# Patient Record
Sex: FEMALE | Race: OTHER
[De-identification: names, ages, dates, MRNs, and addresses within clinical notes are randomized per-mention and may not be internally consistent; named-entity substitution may affect disease eponyms.]

---

## 2018-05-31 ENCOUNTER — HOSPITAL ENCOUNTER (EMERGENCY)
Dept: HOSPITAL 54 - ER | Age: 58
LOS: 1 days | Discharge: HOME | End: 2018-06-01
Payer: MEDICARE

## 2018-05-31 VITALS — BODY MASS INDEX: 31.41 KG/M2 | WEIGHT: 160 LBS | HEIGHT: 60 IN

## 2018-05-31 VITALS — SYSTOLIC BLOOD PRESSURE: 157 MMHG | DIASTOLIC BLOOD PRESSURE: 85 MMHG

## 2018-05-31 DIAGNOSIS — M25.521: ICD-10-CM

## 2018-05-31 DIAGNOSIS — M25.511: Primary | ICD-10-CM

## 2018-05-31 DIAGNOSIS — M25.561: ICD-10-CM

## 2018-05-31 DIAGNOSIS — E11.9: ICD-10-CM

## 2018-05-31 DIAGNOSIS — I10: ICD-10-CM

## 2018-05-31 DIAGNOSIS — Z98.84: ICD-10-CM

## 2018-05-31 DIAGNOSIS — Z90.49: ICD-10-CM

## 2018-05-31 PROCEDURE — Z7610: HCPCS

## 2018-05-31 PROCEDURE — A4606 OXYGEN PROBE USED W OXIMETER: HCPCS

## 2019-04-08 ENCOUNTER — HOSPITAL ENCOUNTER (INPATIENT)
Dept: HOSPITAL 10 - E/R | Age: 59
LOS: 1 days | Discharge: HOME | DRG: 563 | End: 2019-04-09
Attending: INTERNAL MEDICINE | Admitting: INTERNAL MEDICINE
Payer: MEDICARE

## 2019-04-08 ENCOUNTER — HOSPITAL ENCOUNTER (INPATIENT)
Dept: HOSPITAL 91 - MS1 | Age: 59
LOS: 1 days | Discharge: HOME | DRG: 563 | End: 2019-04-09
Payer: MEDICARE

## 2019-04-08 VITALS
WEIGHT: 169.32 LBS | BODY MASS INDEX: 34.13 KG/M2 | BODY MASS INDEX: 34.13 KG/M2 | HEIGHT: 59 IN | WEIGHT: 169.32 LBS | HEIGHT: 59 IN

## 2019-04-08 DIAGNOSIS — E78.5: ICD-10-CM

## 2019-04-08 DIAGNOSIS — E87.6: ICD-10-CM

## 2019-04-08 DIAGNOSIS — I10: ICD-10-CM
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
## 2019-04-08 DIAGNOSIS — S42.212A: Primary | ICD-10-CM

## 2019-04-08 LAB
ADD MAN DIFF?: NO
ALANINE AMINOTRANSFERASE: 24 IU/L (ref 13–69)
ALBUMIN/GLOBULIN RATIO: 1.36
ALBUMIN: 4.1 G/DL (ref 3.3–4.9)
ALKALINE PHOSPHATASE: 71 IU/L (ref 42–121)
ANION GAP: 11 (ref 5–13)
ASPARTATE AMINO TRANSFERASE: 29 IU/L (ref 15–46)
BASOPHIL #: 0 10^3/UL (ref 0–0.1)
BASOPHILS %: 0.2 % (ref 0–2)
BILIRUBIN,DIRECT: 0 MG/DL (ref 0–0.2)
BILIRUBIN,TOTAL: 0.4 MG/DL (ref 0.2–1.3)
BLOOD UREA NITROGEN: 17 MG/DL (ref 7–20)
CALCIUM: 8.8 MG/DL (ref 8.4–10.2)
CARBON DIOXIDE: 26 MMOL/L (ref 21–31)
CHLORIDE: 103 MMOL/L (ref 97–110)
CREATININE: 0.38 MG/DL (ref 0.44–1)
EOSINOPHILS #: 0 10^3/UL (ref 0–0.5)
EOSINOPHILS %: 0 % (ref 0–7)
GLOBULIN: 3 G/DL (ref 1.3–3.2)
GLUCOSE: 123 MG/DL (ref 70–220)
HEMATOCRIT: 33.9 % (ref 37–47)
HEMOGLOBIN: 11.3 G/DL (ref 12–16)
IMMATURE GRANS #M: 0.04 10^3/UL (ref 0–0.03)
IMMATURE GRANS % (M): 0.4 % (ref 0–0.43)
INR: 1.12
LIPASE: 90 U/L (ref 23–300)
LYMPHOCYTES #: 0.8 10^3/UL (ref 0.8–2.9)
LYMPHOCYTES %: 6.8 % (ref 15–51)
MEAN CORPUSCULAR HEMOGLOBIN: 29.3 PG (ref 29–33)
MEAN CORPUSCULAR HGB CONC: 33.3 G/DL (ref 32–37)
MEAN CORPUSCULAR VOLUME: 87.8 FL (ref 82–101)
MEAN PLATELET VOLUME: 8.8 FL (ref 7.4–10.4)
MONOCYTE #: 0.4 10^3/UL (ref 0.3–0.9)
MONOCYTES %: 3.2 % (ref 0–11)
NEUTROPHIL #: 9.9 10^3/UL (ref 1.6–7.5)
NEUTROPHILS %: 89.4 % (ref 39–77)
NUCLEATED RED BLOOD CELLS #: 0 10^3/UL (ref 0–0)
NUCLEATED RED BLOOD CELLS%: 0 /100WBC (ref 0–0)
PARTIAL THROMBOPLASTIN TIME: 26.8 SEC (ref 23–35)
PLATELET COUNT: 227 10^3/UL (ref 140–415)
POTASSIUM: 3.4 MMOL/L (ref 3.5–5.1)
PROTIME: 14.5 SEC (ref 11.9–14.9)
PT RATIO: 1.1
RED BLOOD COUNT: 3.86 10^6/UL (ref 4.2–5.4)
RED CELL DISTRIBUTION WIDTH: 11.8 % (ref 11.5–14.5)
SODIUM: 140 MMOL/L (ref 135–144)
TOTAL PROTEIN: 7.1 G/DL (ref 6.1–8.1)
WHITE BLOOD COUNT: 11.1 10^3/UL (ref 4.8–10.8)

## 2019-04-08 PROCEDURE — 96372 THER/PROPH/DIAG INJ SC/IM: CPT

## 2019-04-08 PROCEDURE — 71045 X-RAY EXAM CHEST 1 VIEW: CPT

## 2019-04-08 PROCEDURE — 73200 CT UPPER EXTREMITY W/O DYE: CPT

## 2019-04-08 PROCEDURE — 73030 X-RAY EXAM OF SHOULDER: CPT

## 2019-04-08 PROCEDURE — 36415 COLL VENOUS BLD VENIPUNCTURE: CPT

## 2019-04-08 PROCEDURE — 96374 THER/PROPH/DIAG INJ IV PUSH: CPT

## 2019-04-08 PROCEDURE — 80053 COMPREHEN METABOLIC PANEL: CPT

## 2019-04-08 PROCEDURE — 85730 THROMBOPLASTIN TIME PARTIAL: CPT

## 2019-04-08 PROCEDURE — 80061 LIPID PANEL: CPT

## 2019-04-08 PROCEDURE — 96375 TX/PRO/DX INJ NEW DRUG ADDON: CPT

## 2019-04-08 PROCEDURE — 85610 PROTHROMBIN TIME: CPT

## 2019-04-08 PROCEDURE — 84100 ASSAY OF PHOSPHORUS: CPT

## 2019-04-08 PROCEDURE — 83735 ASSAY OF MAGNESIUM: CPT

## 2019-04-08 PROCEDURE — 99285 EMERGENCY DEPT VISIT HI MDM: CPT

## 2019-04-08 PROCEDURE — 85025 COMPLETE CBC W/AUTO DIFF WBC: CPT

## 2019-04-08 PROCEDURE — 83036 HEMOGLOBIN GLYCOSYLATED A1C: CPT

## 2019-04-08 PROCEDURE — 72170 X-RAY EXAM OF PELVIS: CPT

## 2019-04-08 PROCEDURE — 83690 ASSAY OF LIPASE: CPT

## 2019-04-08 RX ADMIN — ONDANSETRON HYDROCHLORIDE 1 MG: 2 INJECTION, SOLUTION INTRAMUSCULAR; INTRAVENOUS at 21:44

## 2019-04-08 RX ADMIN — LIDOCAINE HYDROCHLORIDE 1 MLS/HR: 10 INJECTION, SOLUTION EPIDURAL; INFILTRATION; INTRACAUDAL; PERINEURAL at 21:45

## 2019-04-08 RX ADMIN — LORAZEPAM 1 MG: 0.5 TABLET ORAL at 20:10

## 2019-04-08 RX ADMIN — KETOROLAC TROMETHAMINE 1 MG: 30 INJECTION, SOLUTION INTRAMUSCULAR at 19:56

## 2019-04-08 NOTE — ERD
ER Documentation


Chief Complaint


Chief Complaint





left shoulder & wrist pain s/p GLF getting into car.





HPI


58-year-old woman brought in by EMS after trip and fall while walking to her 


car, she fell toward her left side and landed on her left shoulder and describes


pain and discomfort to the left shoulder and left thigh.  She denies head or 


neck injury, no loss of consciousness, no chest pain or shortness of breath, no 


complaints of ankle pain or swelling.  Patient was transported here by EMS 


without further complications.





ROS


All systems reviewed and are negative except as per history of present illness.





Medications


Home Meds


Reported Medications


Ergocalciferol (Vitamin D2) (VITAMIN D2) 50,000 Unit Capsule, 04812 UNIT PO 


QSUNDAY, CAP


   4/8/19


Hydrocodone/Acetaminophen (Norco 5-325 Tablet) 1 Each Tablet, 1 EACH PO Q6H PRN 


for PAIN LEVEL 6-10, TAB


   4/8/19


Linaclotide (LINZESS) 145 Mcg Capsule, 145 MCG PO DAILY for 30 Days, #30


   4/8/19


Solifenacin* (Vesicare*) 5 Mg Tablet, 5 MG PO DAILY for 30 Days, #30


   4/8/19


Escitalopram Oxalate* (Escitalopram Oxalate*) 10 Mg Tablet, 10 MG PO DAILY for 


30 Days, #30


   4/8/19


Alendronate Sodium* (Fosamax*) 70 Mg Tablet, 70 MG PO Q7D PRN for QSUNDAY for 28


Days


   4/8/19


Omeprazole* (Omeprazole*) 20 Mg Capsule.dr, 20 MG PO DAILY for 30 Days, #30


   4/8/19


Atorvastatin (Atorvastatin) 10 Mg Tablet, 10 MG PO QHS for 30 Days, #30


   4/8/19


Tramadol HCl (Tramadol HCl) 50 Mg Tablet, 50 MG PO Q6H PRN for PAIN LEVEL 6-10


   4/8/19


Zolpidem Tartrate* (Zolpidem Tartrate*) 10 Mg Tablet, 10 MG PO DAILY for 30 


Days, #30


   4/8/19


Amlodipine Besylate* (Amlodipine Besylate*) 10 Mg Tablet, 10 MG PO DAILY for 30 


Days, #30


   4/8/19


Atenolol* (Atenolol*) 25 Mg Tablet, 25 MG PO DAILY for 30 Days, #30


   4/8/19


Metformin* (Glucophage*) 500 Mg Tab, 500 MG PO DAILY for 30 Days, #30


   4/8/19


Diclofenac Sodium* (Diclofenac Sodium*) 75 Mg Tablet.dr, 75 MG PO BID for 30 


Days, #60


   4/8/19


Furosemide* (Furosemide*) 20 Mg Tablet, 20 MG PO for 30 Days, #30


   4/8/19


Lisinopril* (Lisinopril*) 20 Mg Tablet, 20 MG PO BID for 30 Days, #60


   4/8/19





Allergies


Allergies:  


Coded Allergies:  


     No Known Allergy (Unverified , 4/8/19)





PMhx/Soc


History of Surgery:  Yes (LEFT ARM REPAIR, METAL IMPLANT)


Anesthesia Reaction:  No


Hx Neurological Disorder:  No


Hx Respiratory Disorders:  No


Hx Cardiac Disorders:  Yes (HTN)


Hx Psychiatric Problems:  No


Hx Miscellaneous Medical Probl:  No


Hx Alcohol Use:  No


Hx Substance Use:  No


Hx Tobacco Use:  No


Smoking Status:  Never smoker





FmHx


Family History:  No diabetes





Physical Exam


Vitals





Vital Signs


  Date      Temp  Pulse  Resp  B/P (MAP)   Pulse Ox  O2          O2 Flow    FiO2


Time                                                 Delivery    Rate


    4/8/19  99.1     83    20      142/70        99  Room Air


     21:29                           (94)


    4/8/19  99.1     86    24      144/75       100  Room Air


     20:19                           (98)


    4/8/19  99.1     81    24      150/80       100  Room Air


     19:33                          (103)


    4/8/19  99.1     72    18      205/91       100


     18:10                          (129)





Physical Exam


Const:   Anxious, afebrile, moderate discomfort


Head:   Atraumatic, no hemotympanum


Eyes:    Normal Conjunctiva


ENT:    Normal External Ears, Nose and Mouth.


Neck:               Full range of motion. No meningismus.


Resp:   Clear to auscultation bilaterally


Cardio:   Regular rate and rhythm, no murmurs


Abd:    Soft, non tender, non distended. Normal bowel sounds


Skin:   No petechiae or rashes, no obvious hematomas, abrasions, ecchymosis


Back:   No midline or flank tenderness


Ext:    No cyanosis, or edema.  Tenderness over the left lateral shoulder, 


distal pulses equal bilateral


Neur:   Awake and alert x3, no focal deficits or facial asymmetry, moving all 


extremities


Psych:    Anxious


Results 24 hrs





Current Medications


 Medications
   Dose
          Sig/Luther
       Start Time
   Status  Last


 (Trade)       Ordered        Route
 PRN     Stop Time              Admin
Dose


                              Reason                                Admin


 Ketorolac
     30 mg          ONCE  STAT
    4/8/19        DC            4/8/19


Tromethamine
                 IM
            19:48
 4/8/19                19:56



 (Toradol)                                   19:50


 Lorazepam
     0.5 mg         ONCE  ONCE
    4/8/19        DC            4/8/19


(Ativan)                      PO
            20:00
 4/8/19                20:10



                                             20:01


 Sodium         500 ml @ 
     Q1H STAT
      4/8/19 4/8/19


Chloride       500 mls/hr     IV
            21:40
 4/8/19                21:45



                                             22:39


 Morphine       4 mg           ONCE  STAT
    4/8/19        DC            4/8/19


Sulfate
                      IV
            21:40
 4/8/19                21:44



(morphine)                                   21:41


 Ondansetron    4 mg           ONCE  STAT
    4/8/19        DC            4/8/19


HCl
  (Zofran                 IV
            21:40
 4/8/19                21:44



Inj)                                         21:41








Procedures/MDM


IV line was established patient was placed on cardiac monitor rhythm strip 


revealed a sinus rhythm at about 80 bpm with upright P and T waves.  Patient was


afebrile





I initially administered Toradol 30 mg IM x1 and lorazepam 0.5 mg p.o. for her 


symptoms.





1 view chest x-ray performed, read by me revealed a left shoulder fracture, no 


acute infiltrates, no pneumothorax.





Three-view x-ray of the left shoulder performed, read by me revealed a 


transverse displaced humeral neck fracture





X-ray Pelvis 1V Interpreted by me: 


Bones:    No fracture


Joints:       No dislocation


Foreign body:    None





I started the IV line and administered 500 cc normal saline and also 


administered morphine 4 mg IV and Zofran 4 mg IV.





I spoke to orthopedic surgeon on call regarding the patient's x-ray findings, 


presentation and symptomatology, he agreed to admission and agreed to consult 


the patient.  He also ordered CT scan imaging of the left upper extremity.





Patient admitted to [facility].





Departure


Diagnosis:  


   Primary Impression:  


   Left humeral fracture


   Encounter type:  initial encounter  Humerus Location:  surgical neck  


   Fracture type:  closed  Fracture morphology:  unspecified fracture morphology


    Fracture alignment:  displaced  Qualified Codes:  S42.212A - Unspecified 


   displaced fracture of surgical neck of left humerus, initial encounter for 


   closed fracture


Condition:  LENY Schwartz MD              Apr 8, 2019 19:51

## 2019-04-08 NOTE — HP
Date/Time of Note


Date/Time of Note


DATE: 4/8/19 


TIME: 23:58





Assessment/Plan


VTE Prophylaxis


Pharmacological prophylaxis:  heparin





Lines/Catheters


IV Catheter Type (from Nrsg):  Saline Lock





Assessment/Plan


Assessment/Plan





1.  Left proximal humeral fracture: Status post mechanical fall


-Patient with a history of left shoulder surgery in 2007


-Pain management


-Awaiting Ortho eval





2.  Hypertension: BP within goal.  Continue home meds





3.  Dyslipidemia: Continue statin


Result Diagram:  


4/8/19 2230 4/8/19 2230





Results 24hrs





Laboratory Tests


               Test
                                4/8/19
22:30


               White Blood Count                         11.1  H


               Red Blood Count                           3.86  L


               Hemoglobin                                11.3  L


               Hematocrit                                33.9  L


               Mean Corpuscular Volume                    87.8


               Mean Corpuscular Hemoglobin                29.3


               Mean Corpuscular Hemoglobin
Concent       33.3  



               Red Cell Distribution Width                11.8


               Platelet Count                              227


               Mean Platelet Volume                        8.8


               Immature Granulocytes %                   0.400


               Neutrophils %                             89.4  H


               Lymphocytes %                              6.8  L


               Monocytes %                                 3.2


               Eosinophils %                               0.0


               Basophils %                                 0.2


               Nucleated Red Blood Cells %                 0.0


               Immature Granulocytes #                  0.040  H


               Neutrophils #                              9.9  H


               Lymphocytes #                               0.8


               Monocytes #                                 0.4


               Eosinophils #                               0.0


               Basophils #                                 0.0


               Nucleated Red Blood Cells #                 0.0


               Prothrombin Time                           14.5


               Prothrombin Time Ratio                      1.1


               INR International Normalized
Ratio        1.12  



               Activated Partial
Thromboplast Time       26.8  



               Sodium Level                                140


               Potassium Level                            3.4  L


               Chloride Level                              103


               Carbon Dioxide Level                         26


               Anion Gap                                    11


               Blood Urea Nitrogen                          17


               Creatinine                                0.38  L


               Est Glomerular Filtrat Rate
mL/min   > 60  



               Glucose Level                               123


               Calcium Level                               8.8


               Total Bilirubin                             0.4


               Direct Bilirubin                           0.00


               Indirect Bilirubin                          0.4


               Aspartate Amino Transf
(AST/SGOT)           29  



               Alanine Aminotransferase
(ALT/SGPT)         24  



               Alkaline Phosphatase                         71


               Total Protein                               7.1


               Albumin                                     4.1


               Globulin                                   3.00


               Albumin/Globulin Ratio                     1.36


               Lipase                                       90








HPI/ROS


Admit Date/Time


Admit Date/Time








Hx of Present Illness





This is a 4-year-old female with a history of hypertension, dyslipidemia, left 


shoulder surgery in 2007, gastric bypass surgery.  Patient presented to ER 


complaining of left shoulder pain status post mechanical fall.  Daughter was at 


the bedside helping with the history.  Patient tripped over a small hole that 


was outside the bathroom which resulted in her fall.  Imaging in the ER shows 


acute left proximal humeral fracture.





PMH/Family/Social


Past Medical History


Medical History:  other (see hpi)


Coded Allergies:  


     azithromycin (Verified  Allergy, Unknown, STOMACH UPSET, VITTING, 4/8/19)





Past Surgical History


Past Surgical Hx:  other (see hpi)





Family History


Significant Family History:  no pertinent family hx





Social History


Alcohol Use:  other


Smoking Status:  Never smoker


Drug Use:  none











Exam


Constitutional:  other (no acute distress)


Head:  normocephalic


Neck:  supple


Respiratory:  normal air movement


Cardiovascular:  regular rate and rhythm


Gastrointestinal:  soft


Coded Allergies:  


     No Known Allergy (Unverified , 4/8/19)





Social History


Smoking Status:  Never smoker





Exam/Review of Systems


Vital Signs


Vitals





Vital Signs


  Date      Temp  Pulse  Resp  B/P (MAP)   Pulse Ox  O2          O2 Flow    FiO2


Time                                                 Delivery    Rate


    4/8/19  99.1     81    19      121/67        95  Room Air


     22:57                           (85)














ELIZABETH HENSON MD                  Apr 8, 2019 23:58

## 2019-04-09 VITALS — SYSTOLIC BLOOD PRESSURE: 142 MMHG | HEART RATE: 89 BPM | RESPIRATION RATE: 18 BRPM | DIASTOLIC BLOOD PRESSURE: 68 MMHG

## 2019-04-09 VITALS — SYSTOLIC BLOOD PRESSURE: 120 MMHG | DIASTOLIC BLOOD PRESSURE: 57 MMHG | RESPIRATION RATE: 18 BRPM | HEART RATE: 70 BPM

## 2019-04-09 VITALS — RESPIRATION RATE: 19 BRPM | DIASTOLIC BLOOD PRESSURE: 61 MMHG | SYSTOLIC BLOOD PRESSURE: 136 MMHG | HEART RATE: 78 BPM

## 2019-04-09 LAB
ADD MAN DIFF?: NO
ALANINE AMINOTRANSFERASE: 18 IU/L (ref 13–69)
ALBUMIN/GLOBULIN RATIO: 1.35
ALBUMIN: 3.8 G/DL (ref 3.3–4.9)
ALKALINE PHOSPHATASE: 59 IU/L (ref 42–121)
ANION GAP: 10 (ref 5–13)
ASPARTATE AMINO TRANSFERASE: 25 IU/L (ref 15–46)
BASOPHIL #: 0 10^3/UL (ref 0–0.1)
BASOPHILS %: 0.4 % (ref 0–2)
BILIRUBIN,DIRECT: 0 MG/DL (ref 0–0.2)
BILIRUBIN,TOTAL: 0.6 MG/DL (ref 0.2–1.3)
BLOOD UREA NITROGEN: 17 MG/DL (ref 7–20)
CALCIUM: 8.6 MG/DL (ref 8.4–10.2)
CARBON DIOXIDE: 28 MMOL/L (ref 21–31)
CHLORIDE: 102 MMOL/L (ref 97–110)
CHOL/HDL RATIO: 3.1 RATIO
CHOLESTEROL: 193 MG/DL (ref 100–200)
CREATININE: 0.43 MG/DL (ref 0.44–1)
EOSINOPHILS #: 0 10^3/UL (ref 0–0.5)
EOSINOPHILS %: 0.1 % (ref 0–7)
GLOBULIN: 2.8 G/DL (ref 1.3–3.2)
GLUCOSE: 102 MG/DL (ref 70–220)
HDL CHOLESTEROL: 62 MG/DL (ref 35–98)
HEMATOCRIT: 32.4 % (ref 37–47)
HEMOGLOBIN A1C: 5.1 % (ref 0–5.9)
HEMOGLOBIN: 10.8 G/DL (ref 12–16)
IMMATURE GRANS #M: 0.03 10^3/UL (ref 0–0.03)
IMMATURE GRANS % (M): 0.4 % (ref 0–0.43)
LDL CHOLESTEROL,CALCULATED: 114 MG/DL
LYMPHOCYTES #: 1.9 10^3/UL (ref 0.8–2.9)
LYMPHOCYTES %: 22.5 % (ref 15–51)
MAGNESIUM: 1.7 MG/DL (ref 1.7–2.5)
MEAN CORPUSCULAR HEMOGLOBIN: 29.3 PG (ref 29–33)
MEAN CORPUSCULAR HGB CONC: 33.3 G/DL (ref 32–37)
MEAN CORPUSCULAR VOLUME: 87.8 FL (ref 82–101)
MEAN PLATELET VOLUME: 9.3 FL (ref 7.4–10.4)
MONOCYTE #: 0.6 10^3/UL (ref 0.3–0.9)
MONOCYTES %: 7.3 % (ref 0–11)
NEUTROPHIL #: 5.9 10^3/UL (ref 1.6–7.5)
NEUTROPHILS %: 69.3 % (ref 39–77)
NUCLEATED RED BLOOD CELLS #: 0 10^3/UL (ref 0–0)
NUCLEATED RED BLOOD CELLS%: 0 /100WBC (ref 0–0)
PHOSPHORUS: 4.6 MG/DL (ref 2.5–4.9)
PLATELET COUNT: 226 10^3/UL (ref 140–415)
POTASSIUM: 3.2 MMOL/L (ref 3.5–5.1)
RED BLOOD COUNT: 3.69 10^6/UL (ref 4.2–5.4)
RED CELL DISTRIBUTION WIDTH: 11.9 % (ref 11.5–14.5)
SODIUM: 140 MMOL/L (ref 135–144)
TOTAL PROTEIN: 6.6 G/DL (ref 6.1–8.1)
TRIGLYCERIDES: 85 MG/DL (ref 0–149)
WHITE BLOOD COUNT: 8.5 10^3/UL (ref 4.8–10.8)

## 2019-04-09 RX ADMIN — SOLIFENACIN SUCCINATE 1 MG: 5 TABLET, FILM COATED ORAL at 09:00

## 2019-04-09 RX ADMIN — HYDROCODONE BITARTRATE AND ACETAMINOPHEN PRN TAB: 5; 325 TABLET ORAL at 09:57

## 2019-04-09 RX ADMIN — ESCITALOPRAM OXALATE 1 MG: 10 TABLET ORAL at 09:00

## 2019-04-09 RX ADMIN — PANTOPRAZOLE SODIUM 1 MG: 40 TABLET, DELAYED RELEASE ORAL at 05:08

## 2019-04-09 RX ADMIN — AMLODIPINE BESYLATE 1 MG: 10 TABLET ORAL at 09:00

## 2019-04-09 RX ADMIN — HEPARIN SODIUM 1 UNIT: 5000 INJECTION, SOLUTION INTRAVENOUS; SUBCUTANEOUS at 09:00

## 2019-04-09 RX ADMIN — FUROSEMIDE 1 MG: 20 TABLET ORAL at 09:00

## 2019-04-09 RX ADMIN — DICLOFENAC SODIUM 1 MG: 75 TABLET, DELAYED RELEASE ORAL at 09:00

## 2019-04-09 RX ADMIN — LISINOPRIL 1 MG: 20 TABLET ORAL at 09:00

## 2019-04-09 RX ADMIN — ATENOLOL 1 MG: 25 TABLET ORAL at 08:47

## 2019-04-09 RX ADMIN — HYDROCODONE BITARTRATE AND ACETAMINOPHEN 1 TAB: 5; 325 TABLET ORAL at 05:38

## 2019-04-09 RX ADMIN — HYDROCODONE BITARTRATE AND ACETAMINOPHEN 1 TAB: 5; 325 TABLET ORAL at 12:08

## 2019-04-09 RX ADMIN — HYDROCODONE BITARTRATE AND ACETAMINOPHEN PRN TAB: 5; 325 TABLET ORAL at 05:38

## 2019-04-09 RX ADMIN — HYDROCODONE BITARTRATE AND ACETAMINOPHEN 1 TAB: 5; 325 TABLET ORAL at 01:15

## 2019-04-09 RX ADMIN — HYDROCODONE BITARTRATE AND ACETAMINOPHEN 1 TAB: 5; 325 TABLET ORAL at 09:57

## 2019-04-09 NOTE — HPN
Date/Time of Note


Date/Time of Note


DATE: 4/9/19 


TIME: 07:24





Interval H&P Admission Note


Pt. seen H&P reviewed:  No system changes


Patient denies fever, chills, shortness of breath, chest pain, nausea/vomiting, 


constipation, diarrhea, numbness, and tingling.








MUSCULOSKELETAL:


Right extremity


Skin intact


Sensation intact to light touch in a sural, saphenous, deep peroneal, 


superficial peroneal, medial and lateral plantar nerve distribution. Motor is 


intact, patient able to dorsiflex and plantarflex ankle and extend and flex 


great toe. Dorsalis Pedis pulse +2, Brisk capillary refill.  Compartments are 


soft.  Calves non-tender to palpation bilaterally.











DAYANARA GALLO MD                Apr 9, 2019 07:24

## 2019-04-09 NOTE — DS
Date/Time of Note


Date/Time of Note


DATE: 4/9/19 


TIME: 12:12





Discharge Summary


Admission/Discharge Info


Admit Date/Time


Apr 8, 2019 at 21:47


Discharge Date/Time





Discharge Diagnosis


1.  Left proximal humeral fracture: Status post mechanical fall, h/o left shoul


olvin surgery in 2007, patient wants to be seen by the same ortho and that is 


arranged


2.  Hypertension, controlled


3.  Dyslipidemia: Continue statin


4. Hypokalemia, KCL


5. Large glenohumeral lipohemarthrosis.


Patient Condition:  Stable


Procedures


                                        


PROCEDURE:   CT LEFT SHOULDER WITHOUT CONTRAST


 


CLINICAL INDICATION: 58 years of age, female. Left proximal humerus fracture.


 


TECHNIQUE:  A CT of the left shoulder was performed without intravenous 


contrast.  Coronal and sagittal reformatted images were obtained from the axial 


source images. Images were reviewed on a high-resolution PACS workstation. 3-D 


volume rendering was not performed.  DICOM images are available.


 


CTDIvol:  36 mGy. DLP:  815 mGy-cm.


One or more of the following dose reduction techniques were used:  


- Automated exposure control.


- Adjustment of the mA and/or kV according to patient size. 


- Use of iterative reconstruction technique.


 


COMPARISON:   Left shoulder x-rays from earlier the same day 


 


FINDINGS:


 


BONES AND JOINTS:


 


There is an acute comminuted fracture of the proximal humerus that involves the 


surgical neck and the greater tuberosity. There is impaction at the fracture 


through the surgical neck with angulation apex-anterior. There is anterior 


displacement of the distal fracture fragment a 1 cm. The acute fracture of the 


greater tuberosity is displaced laterally 0.3 cm.


 


Alignment of the glenohumeral joint is anatomic. Negative for acute fracture of 


the glenoid or scapula. There is a large lipohemarthrosis at the left 


glenohumeral joint. The lipohemarthrosis extends into the subacromial space 


likely due to the fracture of the greater tuberosity.


 


Acromioclavicular joint is unremarkable. Coraco-clavicular interval is normal.


 


Bones are osteopenic.


 


SOFT TISSUES:


 


Muscles of the rotator cuff are normal in bulk. There is mild periarticular soft


tissue swelling over the left shoulder.


 


VISUALIZED CHEST: 


 


Visualized chest is unremarkable. 


 


Additional comment:  None.


 


IMPRESSION:


 


1.  Acute comminuted fracture of the proximal humerus involving the surgical 


neck and greater tuberosity as described. The appearance is in keeping with a 


two-part fracture with significant displacement and angulation of the fracture 


of the surgical neck.


 


2.  Large glenohumeral lipohemarthrosis.


 


 


 


 


 


RPTAT: HCTS


_____________________________________________ 


Antonio Finney Physician           Date    Time 


Electronically viewed and signed by Antonio Finney Physician on 04/08/2019 


21:50 


 


D:  04/08/2019 21:50  T:  04/08/2019 21:50


CS/





CC: DAYANARA GALLO MD


Hospital Course


This is a 4-year-old female with a history of hypertension, dyslipidemia, left 


shoulder surgery in 2007, gastric bypass surgery.  Patient presented to ER 


complaining of left shoulder pain status post mechanical fall.  Daughter was at 


the bedside helping with the history.  Patient tripped over a small hole that 


was outside the bathroom which resulted in her fall.  Imaging in the ER shows 


acute left proximal humeral fracture.





Patient is scheduled to see orthopedics here in [facility] but patient


and family arrange her to see an ortho in [facility] today since


that ortho did surgery for her left shoulder in 2007. Patient is medically 


stable to be discharged and goes to see her own orthopedics in [facility] today.


Home Meds


Reported Medications


Ergocalciferol (Vitamin D2) (VITAMIN D2) 50,000 Unit Capsule, 79376 UNIT PO 


QSUNDAY, CAP


   4/8/19


Hydrocodone/Acetaminophen (Norco 5-325 Tablet) 1 Each Tablet, 1 EACH PO Q6H PRN 


for PAIN LEVEL 6-10, TAB


   4/8/19


Linaclotide (LINZESS) 145 Mcg Capsule, 145 MCG PO DAILY for 30 Days, #30


   4/8/19


Solifenacin* (Vesicare*) 5 Mg Tablet, 5 MG PO DAILY for 30 Days, #30


   4/8/19


Escitalopram Oxalate* (Escitalopram Oxalate*) 10 Mg Tablet, 10 MG PO DAILY for 


30 Days, #30


   4/8/19


Alendronate Sodium* (Fosamax*) 70 Mg Tablet, 70 MG PO Q7D PRN for QSUNDAY for 28


Days


   4/8/19


Omeprazole* (Omeprazole*) 20 Mg Capsule.dr, 20 MG PO DAILY for 30 Days, #30


   4/8/19


Atorvastatin (Atorvastatin) 10 Mg Tablet, 10 MG PO QHS for 30 Days, #30


   4/8/19


Tramadol HCl (Tramadol HCl) 50 Mg Tablet, 50 MG PO Q6H PRN for PAIN LEVEL 6-10


   4/8/19


Zolpidem Tartrate* (Zolpidem Tartrate*) 10 Mg Tablet, 10 MG PO DAILY for 30 


Days, #30


   4/8/19


Amlodipine Besylate* (Amlodipine Besylate*) 10 Mg Tablet, 10 MG PO DAILY for 30 


Days, #30


   4/8/19


Atenolol* (Atenolol*) 25 Mg Tablet, 25 MG PO DAILY for 30 Days, #30


   4/8/19


Metformin* (Glucophage*) 500 Mg Tab, 500 MG PO DAILY for 30 Days, #30


   4/8/19


Diclofenac Sodium* (Diclofenac Sodium*) 75 Mg Tablet.dr, 75 MG PO BID for 30 Da


ys, #60


   4/8/19


Furosemide* (Furosemide*) 20 Mg Tablet, 20 MG PO for 30 Days, #30


   4/8/19


Lisinopril* (Lisinopril*) 20 Mg Tablet, 20 MG PO BID for 30 Days, #60


   4/8/19


Follow-up Plan


PCP and ortho today


Primary Care Provider


Not On Staff Doctor


Pending Labs





Laboratory Tests


Test
                                    4/8/19
22:30               4/9/19
04:48


White Blood Count
            11.1 10^3/ul
(4.8-10.8)     8.5 10^3/ul
(4.8-10.8)


Red Blood Count
             3.86 10^6/ul
(4.20-5.40)   3.69 10^6/ul
(4.20-5.40)


Hemoglobin
                     11.3 g/dl
(12.0-16.0)      10.8 g/dl
(12.0-16.0)


Hematocrit
                        33.9 %
(37.0-47.0)         32.4 %
(37.0-47.0)


Mean Corpuscular Volume
         87.8 fl
(82.0-101.0)       87.8 fl
(82.0-101.0)


Mean Corpuscular                  29.3 pg
(29.0-33.0)        29.3 pg
(29.0-33.0)


Hemoglobin



Mean Corpuscular                33.3 g/dl
(32.0-37.0)      33.3 g/dl
(32.0-37.0)


Hemoglobin
Concent


Red Cell Distribution              11.8 %
(11.5-14.5)         11.9 %
(11.5-14.5)


Width



Platelet Count
                 227 10^3/UL
(140-415)      226 10^3/UL
(140-415)


Mean Platelet Volume
               8.8 fl
(7.4-10.4)          9.3 fl
(7.4-10.4)


Immature Granulocytes %
        0.400 %
(0.001-0.429)      0.400 %
(0.001-0.429)


Neutrophils %
                     89.4 %
(39.0-77.0)         69.3 %
(39.0-77.0)


Lymphocytes %
                      6.8 %
(15.0-51.0)         22.5 %
(15.0-51.0)


Monocytes %
                         3.2 %
(0.0-11.0)           7.3 %
(0.0-11.0)


Eosinophils %
                        0.0 %
(0.0-7.0)            0.1 %
(0.0-7.0)


Basophils %
                          0.2 %
(0.0-2.0)            0.4 %
(0.0-2.0)


Nucleated Red Blood Cells       0.0 /100WBC
(0.0-0.0)      0.0 /100WBC
(0.0-0.0)


%



Immature Granulocytes #
    0.040 10^3/ul
(0.0-0.031)  0.030 10^3/ul
(0.0-0.031)


Neutrophils #
                  9.9 10^3/ul
(1.6-7.5)      5.9 10^3/ul
(1.6-7.5)


Lymphocytes #
                  0.8 10^3/ul
(0.8-2.9)      1.9 10^3/ul
(0.8-2.9)


Monocytes #
                    0.4 10^3/ul
(0.3-0.9)      0.6 10^3/ul
(0.3-0.9)


Eosinophils #
                  0.0 10^3/ul
(0.0-0.5)      0.0 10^3/ul
(0.0-0.5)


Basophils #
                    0.0 10^3/ul
(0.0-0.1)      0.0 10^3/ul
(0.0-0.1)


Nucleated Red Blood Cells       0.0 10^3/ul
(0.0-0.0)      0.0 10^3/ul
(0.0-0.0)


#



Prothrombin Time
                14.5 Sec
(11.9-14.9)  



Prothrombin Time Ratio                           1.1


INR International                              1.12 
  



Normalized
Ratio


Activated                        26.8 Sec
(23.0-35.0)  



Partial
Thromboplast Time


Sodium Level
                    140 mmol/L
(135-144)       140 mmol/L
(135-144)


Potassium Level
                 3.4 mmol/L
(3.5-5.1)       3.2 mmol/L
(3.5-5.1)


Chloride Level
                   103 mmol/L
()        102 mmol/L
()


Carbon Dioxide Level
               26 mmol/L
(21-31)          28 mmol/L
(21-31)


Anion Gap                                  11 (5-13)                  10 (5-13)


Blood Urea Nitrogen
                  17 mg/dl
(7-20)            17 mg/dl
(7-20)


Creatinine
                    0.38 mg/dl
(0.44-1.00)     0.43 mg/dl
(0.44-1.00)


Est Glomerular Filtrat      > 60 mL/min
(>60)          > 60 mL/min
(>60)


Rate
mL/min


Glucose Level
                     123 mg/dl
()         102 mg/dl
()


Calcium Level
                   8.8 mg/dl
(8.4-10.2)       8.6 mg/dl
(8.4-10.2)


Total Bilirubin
                  0.4 mg/dl
(0.2-1.3)        0.6 mg/dl
(0.2-1.3)


Direct Bilirubin
              0.00 mg/dl
(0.00-0.20)     0.00 mg/dl
(0.00-0.20)


Indirect Bilirubin
                 0.4 mg/dl
(0-1.1)          0.6 mg/dl
(0-1.1)


Aspartate Amino                       29 IU/L
(15-46)            25 IU/L
(15-46)


Transf
(AST/SGOT)


Alanine                               24 IU/L
(13-69)            18 IU/L
(13-69)


Aminotransferase
(ALT/SGPT


)


Alkaline Phosphatase
                71 IU/L
()           59 IU/L
()


Total Protein
                     7.1 g/dl
(6.1-8.1)         6.6 g/dl
(6.1-8.1)


Albumin
                           4.1 g/dl
(3.3-4.9)         3.8 g/dl
(3.3-4.9)


Globulin
                         3.00 g/dl
(1.3-3.2)        2.80 g/dl
(1.3-3.2)


Albumin/Globulin Ratio                          1.36                       1.35


Lipase
                               90 U/L
()  



Hemoglobin A1c                                                    5.1 % (0-5.9)


Phosphorus Level
           
                                4.6 mg/dl
(2.5-4.9)


Magnesium Level
            
                                1.7 mg/dl
(1.7-2.5)


Triglycerides Level
        
                                   85 mg/dl
(0-149)


Cholesterol Level
          
                                193 mg/dl
(100-200)


LDL Cholesterol,                                                      114 mg/dl


Calculated


HDL Cholesterol
            
                                   62 mg/dl
(35-98)


Cholesterol/HDL Ratio                                                 3.1 RATIO














HELENA HOUSE MD                 Apr 9, 2019 12:19